# Patient Record
Sex: MALE | Race: ASIAN | Employment: FULL TIME | URBAN - METROPOLITAN AREA
[De-identification: names, ages, dates, MRNs, and addresses within clinical notes are randomized per-mention and may not be internally consistent; named-entity substitution may affect disease eponyms.]

---

## 2020-01-22 ENCOUNTER — HOSPITAL ENCOUNTER (EMERGENCY)
Age: 25
Discharge: HOME | End: 2020-01-22
Attending: EMERGENCY MEDICINE

## 2020-01-22 ENCOUNTER — APPOINTMENT (OUTPATIENT)
Dept: GENERAL RADIOLOGY | Age: 25
End: 2020-01-22

## 2020-01-22 VITALS
HEART RATE: 82 BPM | TEMPERATURE: 98.2 F | OXYGEN SATURATION: 99 % | DIASTOLIC BLOOD PRESSURE: 78 MMHG | SYSTOLIC BLOOD PRESSURE: 115 MMHG | RESPIRATION RATE: 16 BRPM

## 2020-01-22 PROCEDURE — 73562 X-RAY EXAM OF KNEE 3: CPT

## 2020-01-22 PROCEDURE — 99283 EMERGENCY DEPT VISIT LOW MDM: CPT

## 2020-01-22 PROCEDURE — 6370000000 HC RX 637 (ALT 250 FOR IP): Performed by: EMERGENCY MEDICINE

## 2020-01-22 RX ORDER — NAPROXEN 500 MG/1
500 TABLET ORAL 2 TIMES DAILY WITH MEALS
Qty: 20 TABLET | Refills: 0 | Status: SHIPPED | OUTPATIENT
Start: 2020-01-22 | End: 2020-02-01

## 2020-01-22 RX ORDER — IBUPROFEN 200 MG
400 TABLET ORAL ONCE
Status: COMPLETED | OUTPATIENT
Start: 2020-01-22 | End: 2020-01-22

## 2020-01-22 RX ORDER — ACETAMINOPHEN 325 MG/1
650 TABLET ORAL ONCE
Status: COMPLETED | OUTPATIENT
Start: 2020-01-22 | End: 2020-01-22

## 2020-01-22 RX ADMIN — IBUPROFEN 400 MG: 200 TABLET, FILM COATED ORAL at 16:12

## 2020-01-22 RX ADMIN — ACETAMINOPHEN 650 MG: 325 TABLET, FILM COATED ORAL at 16:12

## 2020-01-22 SDOH — HEALTH STABILITY: MENTAL HEALTH: HOW OFTEN DO YOU HAVE A DRINK CONTAINING ALCOHOL?: NEVER

## 2020-01-22 ASSESSMENT — PAIN SCALES - GENERAL: PAINLEVEL_OUTOF10: 7

## 2020-01-22 NOTE — ED PROVIDER NOTES
discussed. Final Impression  1. Knee effusion, right    2. Acute pain of right knee        Blood pressure 115/78, pulse 82, temperature 98.2 °F (36.8 °C), resp. rate 16, SpO2 99 %. Disposition:  DISPOSITION Discharge - Pending Orders Complete 01/22/2020 04:08:54 PM      Patient Referrals:  [address] [address] [place] [address], [phone]  [phone]    In 3 days  For your knee pain      Discharge Medications:  New Prescriptions    NAPROXEN (NAPROSYN) 500 MG TABLET    Take 1 tablet by mouth 2 times daily (with meals) for 10 days       Discontinued Medications:  Discontinued Medications    No medications on file       This chart was generated using the ClearApp dictation system. I created this record but it may contain dictation errors given the limitations of this technology.     Angeles Tian DO (electronically signed)  Attending Emergency Physician       Angeles Tian DO  01/22/20 0335

## 2020-01-22 NOTE — LETTER
[facility] Emergency Department  [facility], [address]             January 22, 2020    Patient: Queenie Boast   YOB: 1995   Date of Visit: 1/22/2020       To Whom It May Concern: Queenie Boast was seen and treated in our emergency department on 1/22/2020. He may return to work on 1/24/20.       Sincerely,         Dr. Maribell Duke

## 2020-04-28 ENCOUNTER — OFFICE VISIT (OUTPATIENT)
Dept: PRIMARY CARE CLINIC | Age: 25
End: 2020-04-28

## 2020-04-28 ENCOUNTER — TELEPHONE (OUTPATIENT)
Dept: PRIMARY CARE CLINIC | Age: 25
End: 2020-04-28

## 2020-04-28 VITALS — HEART RATE: 79 BPM | OXYGEN SATURATION: 98 % | TEMPERATURE: 98.8 F

## 2020-04-28 PROCEDURE — 99213 OFFICE O/P EST LOW 20 MIN: CPT | Performed by: NURSE PRACTITIONER

## 2020-04-28 NOTE — PROGRESS NOTES
encounter. [x] Discharge home with written instructions for:  [] Flu management  [] Strep throat management  [] Possible COVID-19 exposure with self-quarantine   [x] Possible COVID-19 with isolation instructions and management of symptoms  [x] Follow-up with primary care physician or emergency department if worsens  [] Referred to emergency department for evaluation    [] Evaluation per physician/nurse practitioner in clinic      An  electronic signature was used to authenticate this note.      --BLANCA Munson - CNP on 4/28/2020 at 10:18 AM

## 2020-04-28 NOTE — PATIENT INSTRUCTIONS

## 2020-04-29 NOTE — TELEPHONE ENCOUNTER
Patient was given financial assistance phone number. Patient is still waiting for his COVID results and if he has any questions or concerns, he will call our office back.
Please schedule follow up.
DC instructions

## 2020-04-30 LAB
SARS-COV-2: NOT DETECTED
SOURCE: NORMAL

## 2023-01-02 ENCOUNTER — HOSPITAL ENCOUNTER (EMERGENCY)
Age: 28
Discharge: HOME | End: 2023-01-03

## 2023-01-02 ENCOUNTER — APPOINTMENT (OUTPATIENT)
Dept: CT IMAGING | Age: 28
End: 2023-01-02

## 2023-01-02 VITALS
SYSTOLIC BLOOD PRESSURE: 125 MMHG | TEMPERATURE: 98.1 F | RESPIRATION RATE: 21 BRPM | WEIGHT: 132 LBS | OXYGEN SATURATION: 98 % | BODY MASS INDEX: 21.21 KG/M2 | HEART RATE: 75 BPM | DIASTOLIC BLOOD PRESSURE: 79 MMHG | HEIGHT: 66 IN
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
## 2023-01-02 DIAGNOSIS — S02.85XA FRACTURE OF RIGHT ORBITAL WALL (HCC): Primary | ICD-10-CM

## 2023-01-02 DIAGNOSIS — S09.90XA CLOSED HEAD INJURY WITHOUT LOSS OF CONSCIOUSNESS, INITIAL ENCOUNTER: ICD-10-CM

## 2023-01-02 PROCEDURE — 99284 EMERGENCY DEPT VISIT MOD MDM: CPT

## 2023-01-02 PROCEDURE — 6360000002 HC RX W HCPCS: Performed by: PHYSICIAN ASSISTANT

## 2023-01-02 PROCEDURE — 72125 CT NECK SPINE W/O DYE: CPT

## 2023-01-02 PROCEDURE — 90714 TD VACC NO PRESV 7 YRS+ IM: CPT | Performed by: PHYSICIAN ASSISTANT

## 2023-01-02 PROCEDURE — 70450 CT HEAD/BRAIN W/O DYE: CPT

## 2023-01-02 PROCEDURE — 70486 CT MAXILLOFACIAL W/O DYE: CPT

## 2023-01-02 PROCEDURE — 90471 IMMUNIZATION ADMIN: CPT | Performed by: PHYSICIAN ASSISTANT

## 2023-01-02 RX ORDER — IBUPROFEN 600 MG/1
600 TABLET ORAL 3 TIMES DAILY PRN
Qty: 30 TABLET | Refills: 0 | Status: SHIPPED | OUTPATIENT
Start: 2023-01-02

## 2023-01-02 RX ADMIN — CLOSTRIDIUM TETANI TOXOID ANTIGEN (FORMALDEHYDE INACTIVATED) AND CORYNEBACTERIUM DIPHTHERIAE TOXOID ANTIGEN (FORMALDEHYDE INACTIVATED) 0.5 ML: 5; 2 INJECTION, SUSPENSION INTRAMUSCULAR at 21:48

## 2023-01-02 ASSESSMENT — ENCOUNTER SYMPTOMS
DIARRHEA: 0
COUGH: 0
BACK PAIN: 0
CHEST TIGHTNESS: 0
NAUSEA: 0
ABDOMINAL PAIN: 0
SHORTNESS OF BREATH: 0
FACIAL SWELLING: 1
VOMITING: 0
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
## 2023-01-03 NOTE — DISCHARGE INSTRUCTIONS
Go to the [facility] maxillofacial surgery office at 8:30 AM on Wednesday morning

## 2023-10-04 ENCOUNTER — OFFICE VISIT (OUTPATIENT)
Dept: FAMILY MEDICINE CLINIC | Age: 28
End: 2023-10-04
Payer: COMMERCIAL

## 2023-10-04 VITALS
OXYGEN SATURATION: 98 % | BODY MASS INDEX: 22.35 KG/M2 | HEIGHT: 66 IN | SYSTOLIC BLOOD PRESSURE: 120 MMHG | TEMPERATURE: 98 F | RESPIRATION RATE: 16 BRPM | DIASTOLIC BLOOD PRESSURE: 86 MMHG | HEART RATE: 84 BPM | WEIGHT: 139.1 LBS

## 2023-10-04 DIAGNOSIS — Z00.00 ANNUAL PHYSICAL EXAM: Primary | ICD-10-CM

## 2023-10-04 DIAGNOSIS — Z23 FLU VACCINE NEED: ICD-10-CM

## 2023-10-04 PROCEDURE — 90674 CCIIV4 VAC NO PRSV 0.5 ML IM: CPT | Performed by: FAMILY MEDICINE

## 2023-10-04 PROCEDURE — 99385 PREV VISIT NEW AGE 18-39: CPT | Performed by: FAMILY MEDICINE

## 2023-10-04 PROCEDURE — G8482 FLU IMMUNIZE ORDER/ADMIN: HCPCS | Performed by: FAMILY MEDICINE

## 2023-10-04 PROCEDURE — 90471 IMMUNIZATION ADMIN: CPT | Performed by: FAMILY MEDICINE

## 2023-10-04 SDOH — ECONOMIC STABILITY: INCOME INSECURITY: HOW HARD IS IT FOR YOU TO PAY FOR THE VERY BASICS LIKE FOOD, HOUSING, MEDICAL CARE, AND HEATING?: NOT HARD AT ALL

## 2023-10-04 SDOH — ECONOMIC STABILITY: HOUSING INSECURITY
IN THE LAST 12 MONTHS, WAS THERE A TIME WHEN YOU DID NOT HAVE A STEADY PLACE TO SLEEP OR SLEPT IN A SHELTER (INCLUDING NOW)?: NO

## 2023-10-04 SDOH — ECONOMIC STABILITY: FOOD INSECURITY: WITHIN THE PAST 12 MONTHS, YOU WORRIED THAT YOUR FOOD WOULD RUN OUT BEFORE YOU GOT MONEY TO BUY MORE.: NEVER TRUE

## 2023-10-04 SDOH — ECONOMIC STABILITY: FOOD INSECURITY: WITHIN THE PAST 12 MONTHS, THE FOOD YOU BOUGHT JUST DIDN'T LAST AND YOU DIDN'T HAVE MONEY TO GET MORE.: NEVER TRUE

## 2023-10-04 ASSESSMENT — PATIENT HEALTH QUESTIONNAIRE - PHQ9
2. FEELING DOWN, DEPRESSED OR HOPELESS: 0
SUM OF ALL RESPONSES TO PHQ QUESTIONS 1-9: 0
SUM OF ALL RESPONSES TO PHQ9 QUESTIONS 1 & 2: 0
1. LITTLE INTEREST OR PLEASURE IN DOING THINGS: 0

## 2023-10-04 ASSESSMENT — ENCOUNTER SYMPTOMS
SINUS PRESSURE: 0
RHINORRHEA: 0
ABDOMINAL PAIN: 0
COUGH: 0
EYE PAIN: 0
SORE THROAT: 0
EYE ITCHING: 0
SHORTNESS OF BREATH: 0
EYES NEGATIVE: 1
WHEEZING: 0

## 2023-10-04 NOTE — PROGRESS NOTES
Jose Calero is a 25y.o. year old male here for:    Chief Complaint:    Chief Complaint   Patient presents with    New Patient       Subjective: Today, his current concerns include:    Pt has left eye prosthesis and needs paperwork signed for new prosthesis  Preventive Services:    Health Maintenance History:  Patient exercises regularly? yes  Diet? ok  Glasses/Eyes: Last visit was      Other Health Maintenance History: In the past two weeks have they been bothered by feeling \"down\", depressed or hopeless?  no  In the past two weeks, have they experienced a loss of interest or pleasure in doing things?  no  In the last year, have you fallen more than once or been seriously injured in a fall?  no  Advance Directives no      Health Maintenance Screening:  Diabetes screening: was provided today  Cholesterol screening: was provided today        Functional Assessment  1. Do you have problems with hearing?  no  2. Do you have problems with vision?  no  History reviewed. No pertinent past medical history. Social History     Tobacco Use    Smoking status: Every Day     Packs/day: .25     Types: Cigarettes    Smokeless tobacco: Never   Substance Use Topics    Alcohol use: Yes    Drug use: Never     Family History   Problem Relation Age of Onset    Anxiety Disorder Mother     Migraines Mother     No Known Problems Father      Past Surgical History:   Procedure Laterality Date    EYE SURGERY Left        No current outpatient medications on file. No Known Allergies    Review of Systems:  Review of Systems   Constitutional: Negative. Negative for fatigue. HENT:  Negative for congestion, ear pain, rhinorrhea, sinus pressure and sore throat. Eyes: Negative. Negative for pain and itching. Respiratory:  Negative for cough, shortness of breath and wheezing. Cardiovascular:  Negative for chest pain and palpitations. Gastrointestinal:  Negative for abdominal pain.    Genitourinary:  Negative for frequency and